# Patient Record
Sex: FEMALE | Race: BLACK OR AFRICAN AMERICAN | NOT HISPANIC OR LATINO
[De-identification: names, ages, dates, MRNs, and addresses within clinical notes are randomized per-mention and may not be internally consistent; named-entity substitution may affect disease eponyms.]

---

## 2018-09-10 ENCOUNTER — RESULT REVIEW (OUTPATIENT)
Age: 43
End: 2018-09-10

## 2019-07-29 PROBLEM — Z00.00 ENCOUNTER FOR PREVENTIVE HEALTH EXAMINATION: Status: ACTIVE | Noted: 2019-07-29

## 2019-07-31 ENCOUNTER — RESULT REVIEW (OUTPATIENT)
Age: 44
End: 2019-07-31

## 2019-08-20 ENCOUNTER — APPOINTMENT (OUTPATIENT)
Dept: OTOLARYNGOLOGY | Facility: CLINIC | Age: 44
End: 2019-08-20
Payer: MEDICAID

## 2019-08-20 VITALS
SYSTOLIC BLOOD PRESSURE: 123 MMHG | BODY MASS INDEX: 33.11 KG/M2 | HEART RATE: 72 BPM | WEIGHT: 206 LBS | DIASTOLIC BLOOD PRESSURE: 82 MMHG | HEIGHT: 66 IN

## 2019-08-20 DIAGNOSIS — R42 DIZZINESS AND GIDDINESS: ICD-10-CM

## 2019-08-20 DIAGNOSIS — R09.81 NASAL CONGESTION: ICD-10-CM

## 2019-08-20 DIAGNOSIS — H93.8X3 OTHER SPECIFIED DISORDERS OF EAR, BILATERAL: ICD-10-CM

## 2019-08-20 DIAGNOSIS — Z78.9 OTHER SPECIFIED HEALTH STATUS: ICD-10-CM

## 2019-08-20 DIAGNOSIS — H91.92 UNSPECIFIED HEARING LOSS, LEFT EAR: ICD-10-CM

## 2019-08-20 DIAGNOSIS — H93.13 TINNITUS, BILATERAL: ICD-10-CM

## 2019-08-20 DIAGNOSIS — Z82.49 FAMILY HISTORY OF ISCHEMIC HEART DISEASE AND OTHER DISEASES OF THE CIRCULATORY SYSTEM: ICD-10-CM

## 2019-08-20 PROCEDURE — 92504 EAR MICROSCOPY EXAMINATION: CPT

## 2019-08-20 PROCEDURE — 99203 OFFICE O/P NEW LOW 30 MIN: CPT | Mod: 25

## 2019-08-20 RX ORDER — AZELASTINE HYDROCHLORIDE 137 UG/1
0.1 SPRAY, METERED NASAL
Qty: 1 | Refills: 2 | Status: ACTIVE | COMMUNITY
Start: 2019-08-20 | End: 1900-01-01

## 2019-08-21 PROBLEM — H93.8X3 CLOGGED EAR, BILATERAL: Status: ACTIVE | Noted: 2019-08-20

## 2019-08-21 PROBLEM — R09.81 NASAL CONGESTION: Status: ACTIVE | Noted: 2019-08-21

## 2019-08-21 PROBLEM — H91.92 HEARING LOSS IN LEFT EAR: Status: ACTIVE | Noted: 2019-08-20

## 2019-08-21 PROBLEM — R42 DIZZINESS: Status: ACTIVE | Noted: 2019-08-20

## 2019-08-21 PROBLEM — H93.13 TINNITUS, BILATERAL: Status: ACTIVE | Noted: 2019-08-20

## 2019-08-21 NOTE — PHYSICAL EXAM
[Binocular Microscopic Exam] : Binocular microscopic exam was performed [Hearing Loss Right Only] : normal [Hearing Loss Left Only] : normal [FreeTextEntry8] : large cerumen impactions, removed [FreeTextEntry9] : large cerumen impactions, removed [Midline] : trachea located in midline position [Normal] : no rashes

## 2019-08-21 NOTE — HISTORY OF PRESENT ILLNESS
[de-identified] : 44 year old female referred by PCP, Dr. Mar, for clogged ears, hearing impairment for the past year, Left worse than right and intermittent tinnitus.  Patient reports ear fullness majority of the time.  States the clogged Left ear causes dizziness occasionally.  Denies otalgia, otorrhea, vertigo, headaches related to ears and ear infections in the past year.

## 2019-08-21 NOTE — PROCEDURE
[Cerumen Impaction] : Cerumen Impaction [Same] : same as the Pre Op Dx. [] : Removal of Cerumen [FreeTextEntry1] : cerumen,ear fullness [FreeTextEntry4] : none [FreeTextEntry6] : Cerumen was removed under binocular microscopy with a combination of a suction, calvin needle, alligator and/or a loop curette. The patient tolerated the procedure well and there were no complications. The included findings were noted.\par

## 2019-08-21 NOTE — REASON FOR VISIT
[Initial Consultation] : an initial consultation for [Other: _____] : [unfilled] [FreeTextEntry2] : Referred by PCP, Dr. Mar, for clogged ears, hearing impairment for the past year, Left worse than right and intermittent tinnitus.

## 2019-11-08 ENCOUNTER — APPOINTMENT (OUTPATIENT)
Dept: OTOLARYNGOLOGY | Facility: CLINIC | Age: 44
End: 2019-11-08

## 2019-11-19 ENCOUNTER — APPOINTMENT (OUTPATIENT)
Dept: OTOLARYNGOLOGY | Facility: CLINIC | Age: 44
End: 2019-11-19

## 2020-03-10 ENCOUNTER — OUTPATIENT (OUTPATIENT)
Dept: OUTPATIENT SERVICES | Facility: HOSPITAL | Age: 45
LOS: 1 days | End: 2020-03-10
Payer: MEDICAID

## 2020-03-10 VITALS
TEMPERATURE: 99 F | DIASTOLIC BLOOD PRESSURE: 92 MMHG | HEART RATE: 67 BPM | SYSTOLIC BLOOD PRESSURE: 147 MMHG | OXYGEN SATURATION: 99 % | WEIGHT: 207.01 LBS | HEIGHT: 66 IN | RESPIRATION RATE: 18 BRPM

## 2020-03-10 DIAGNOSIS — J30.2 OTHER SEASONAL ALLERGIC RHINITIS: ICD-10-CM

## 2020-03-10 DIAGNOSIS — L30.9 DERMATITIS, UNSPECIFIED: ICD-10-CM

## 2020-03-10 DIAGNOSIS — G43.909 MIGRAINE, UNSPECIFIED, NOT INTRACTABLE, WITHOUT STATUS MIGRAINOSUS: ICD-10-CM

## 2020-03-10 DIAGNOSIS — D64.9 ANEMIA, UNSPECIFIED: ICD-10-CM

## 2020-03-10 DIAGNOSIS — I10 ESSENTIAL (PRIMARY) HYPERTENSION: ICD-10-CM

## 2020-03-10 DIAGNOSIS — D25.9 LEIOMYOMA OF UTERUS, UNSPECIFIED: ICD-10-CM

## 2020-03-10 DIAGNOSIS — Z01.818 ENCOUNTER FOR OTHER PREPROCEDURAL EXAMINATION: ICD-10-CM

## 2020-03-10 DIAGNOSIS — Z98.890 OTHER SPECIFIED POSTPROCEDURAL STATES: Chronic | ICD-10-CM

## 2020-03-10 LAB
BLD GP AB SCN SERPL QL: SIGNIFICANT CHANGE UP
HBA1C BLD-MCNC: 5.3 % — SIGNIFICANT CHANGE UP (ref 4–5.6)

## 2020-03-10 PROCEDURE — G0463: CPT

## 2020-03-10 NOTE — H&P PST ADULT - ASSESSMENT
44 yr old female with PMH of Hypertension, seasonal allergies, migraine headache, anemia, eczema presents with leiomyoma of uterus and right ovarian cyst. Pt is scheduled for total abdominal hysterectomy, right ovarian cystectomy on 03/18/2020.

## 2020-03-10 NOTE — H&P PST ADULT - NSICDXPASTMEDICALHX_GEN_ALL_CORE_FT
PAST MEDICAL HISTORY:  Eczema     HTN (hypertension)     Leiomyoma of uterus     Migraine headache     Right ovarian cyst     Seasonal allergies

## 2020-03-10 NOTE — H&P PST ADULT - NSANTHOSAYNRD_GEN_A_CORE
No. ELVIS screening performed.  STOP BANG Legend: 0-2 = LOW Risk; 3-4 = INTERMEDIATE Risk; 5-8 = HIGH Risk

## 2020-03-10 NOTE — H&P PST ADULT - HISTORY OF PRESENT ILLNESS
44 yr old female with PMH of presents with c/o prolonged and heavy menses due to endometrial polyp. Pt for 44 yr old female with PMH of Hypertension, seasonal allergies, migraine headache presents with c/o prolonged and heavy menses associated with anemia, constant crampy pain and clots due to fibroids and right ovarian cyst. Pt for total abdominal hysterectomy, right ovarian cystectomy on 03/18/2020. 44 yr old female with PMH of Hypertension, seasonal allergies, migraine headache presents with c/o prolonged and heavy menses associated with clots and anemia, constant crampy pain, back pain, urinary frequency due to fibroids and right ovarian cyst. Pt for total abdominal hysterectomy, right ovarian cystectomy on 03/18/2020.

## 2020-03-10 NOTE — H&P PST ADULT - NEGATIVE NEUROLOGICAL SYMPTOMS
no focal seizures/no vertigo/no syncope/no difficulty walking/no tremors/no loss of sensation/no generalized seizures

## 2020-03-10 NOTE — H&P PST ADULT - NSICDXFAMILYHX_GEN_ALL_CORE_FT
FAMILY HISTORY:  Family history of asthma in mother  Family history of hypertension in mother  Family history of sarcoidosis, sister  Family history of thrombosis, mother

## 2020-03-10 NOTE — H&P PST ADULT - NEGATIVE GASTROINTESTINAL SYMPTOMS
no flatulence/no abdominal pain/no melena/no vomiting/no diarrhea/no change in bowel habits/no nausea

## 2020-03-10 NOTE — H&P PST ADULT - NSICDXPROBLEM_GEN_ALL_CORE_FT
PROBLEM DIAGNOSES  Problem: Eczema  Assessment and Plan: Instructed to continue use of ointment and follow-up with provider postop for management.      Problem: Leiomyoma of uterus  Assessment and Plan: Total abdominal hysterectomy, right ovarian cystectomy on 03/18/20.Preoperative instructions discussed with pt and given to pt. Verbalized understanding of instructions given.     Problem: Anemia  Assessment and Plan: Instructed to continue Ferrous sulfate and to follow-up with provider postop for management.     Problem: HTN (hypertension)  Assessment and Plan: Instructed to continue Amlodipine and take with sips of water on day of surgery. Lifestyle modifications encouraged. Encouraged compliance with medication.  Cleared by PCP. Follow-up with PCP postop for management.     Problem: Seasonal allergies  Assessment and Plan: Instructed to continue claritin as needed and to follow-up with PCP for allergy management.     Problem: Migraine headache  Assessment and Plan: Pt instructed to avoid NSAIDs 1 week before surgery.  Advised to take Tylenol as needed for pain. Stated understanding of instructions given. PROBLEM DIAGNOSES  Problem: Eczema  Assessment and Plan: Instructed to continue use of ointment and follow-up with provider postop for management.      Problem: Leiomyoma of uterus  Assessment and Plan: Total abdominal hysterectomy, right ovarian cystectomy on 03/18/20.Preoperative instructions discussed with pt and given to pt. Verbalized understanding of instructions given.     Problem: Anemia  Assessment and Plan: Instructed to continue Ferrous sulfate and to follow-up with provider postop for management.     Problem: HTN (hypertension)  Assessment and Plan: Instructed to continue Amlodipine and take with sips of water on day of surgery. Lifestyle modifications encouraged. Encouraged compliance with medication.  Cleared by PCP. Follow-up with PCP postop for management.     Problem: Seasonal allergies  Assessment and Plan: Instructed to continue Claritin as needed and to follow-up with PCP for allergy management.     Problem: Migraine headache  Assessment and Plan: Pt instructed to avoid NSAIDs 1 week before surgery.  Advised to take Tylenol as needed for pain. Stated understanding of instructions given. PROBLEM DIAGNOSES  Problem: At risk for venous thromboembolism (VTE)  Assessment and Plan: VTE score risk 6. Perioperative VTE prophylaxis.    Problem: Eczema  Assessment and Plan: Instructed to continue use of ointment and follow-up with provider postop for management.      Problem: Leiomyoma of uterus  Assessment and Plan: Total abdominal hysterectomy, right ovarian cystectomy on 03/18/20.Preoperative instructions discussed with pt and given to pt. Verbalized understanding of instructions given.     Problem: Anemia  Assessment and Plan: Instructed to continue Ferrous sulfate and to follow-up with provider postop for management.     Problem: HTN (hypertension)  Assessment and Plan: Instructed to continue Amlodipine and take with sips of water on day of surgery. Lifestyle modifications encouraged. Encouraged compliance with medication.  Cleared by PCP. Follow-up with PCP postop for management.     Problem: Seasonal allergies  Assessment and Plan: Instructed to continue claritin as needed and to follow-up with PCP for allergy management.     Problem: Migraine headache  Assessment and Plan: Pt instructed to avoid NSAIDs 1 week before surgery.  Advised to take Tylenol as needed for pain. Stated understanding of instructions given.

## 2020-03-10 NOTE — H&P PST ADULT - ACTIVITY
tolerates stair climbing without any exertional symptoms ; limited due to pain; stopped going to the gym x 1 month

## 2020-03-10 NOTE — H&P PST ADULT - RS GEN PE MLT RESP DETAILS PC
good air movement/no intercostal retractions/normal/airway patent/no wheezes/no chest wall tenderness/breath sounds equal/clear to auscultation bilaterally/no rales/respirations non-labored/no rhonchi/no subcutaneous emphysema

## 2020-08-25 PROBLEM — I10 ESSENTIAL (PRIMARY) HYPERTENSION: Chronic | Status: ACTIVE | Noted: 2020-03-10

## 2020-08-25 PROBLEM — L30.9 DERMATITIS, UNSPECIFIED: Chronic | Status: ACTIVE | Noted: 2020-03-10

## 2020-08-25 PROBLEM — J30.2 OTHER SEASONAL ALLERGIC RHINITIS: Chronic | Status: ACTIVE | Noted: 2020-03-10

## 2020-08-25 PROBLEM — G43.909 MIGRAINE, UNSPECIFIED, NOT INTRACTABLE, WITHOUT STATUS MIGRAINOSUS: Chronic | Status: ACTIVE | Noted: 2020-03-10

## 2020-08-25 PROBLEM — D25.9 LEIOMYOMA OF UTERUS, UNSPECIFIED: Chronic | Status: ACTIVE | Noted: 2020-03-10

## 2020-09-04 ENCOUNTER — OUTPATIENT (OUTPATIENT)
Dept: OUTPATIENT SERVICES | Facility: HOSPITAL | Age: 45
LOS: 1 days | End: 2020-09-04
Payer: MEDICAID

## 2020-09-04 VITALS
HEIGHT: 66 IN | DIASTOLIC BLOOD PRESSURE: 98 MMHG | SYSTOLIC BLOOD PRESSURE: 140 MMHG | TEMPERATURE: 100 F | HEART RATE: 78 BPM | WEIGHT: 210.1 LBS | OXYGEN SATURATION: 100 % | RESPIRATION RATE: 17 BRPM

## 2020-09-04 DIAGNOSIS — Z98.890 OTHER SPECIFIED POSTPROCEDURAL STATES: Chronic | ICD-10-CM

## 2020-09-04 DIAGNOSIS — N83.202 UNSPECIFIED OVARIAN CYST, LEFT SIDE: ICD-10-CM

## 2020-09-04 DIAGNOSIS — I10 ESSENTIAL (PRIMARY) HYPERTENSION: ICD-10-CM

## 2020-09-04 DIAGNOSIS — D25.9 LEIOMYOMA OF UTERUS, UNSPECIFIED: ICD-10-CM

## 2020-09-04 LAB — BLD GP AB SCN SERPL QL: SIGNIFICANT CHANGE UP

## 2020-09-04 PROCEDURE — G0463: CPT

## 2020-09-04 RX ORDER — ACETAMINOPHEN 500 MG
2 TABLET ORAL
Qty: 0 | Refills: 0 | DISCHARGE

## 2020-09-04 RX ORDER — RANITIDINE HYDROCHLORIDE 75 MG/1
2 TABLET, COATED ORAL
Qty: 0 | Refills: 0 | DISCHARGE

## 2020-09-04 NOTE — H&P PST ADULT - ASSESSMENT
45 year old Black female with history of HTN, obesity Eczema experiencing irregular menstrual cycles for the past months cycles lasting 7-10 days. Pt schedule for total hysterectomy left ovarian cystectomy on 9/16/2020.

## 2020-09-04 NOTE — H&P PST ADULT - HISTORY OF PRESENT ILLNESS
45 year old obese Black female with history of Migraines ,Eczema, obesity presents with fibroid uterus and left ovarian cyst. Pt was scheduled for surgery in March was postponed due to Covid virus. Pt now rescheduled for 9/16/2020 for Total Hysterectomy and left ovarian cystectomy. Pt stated her menstrual cycle was normal 28 days lasting only 3 days. Her cycle changed over time with cycle lasting 7-10 days and sometimes cycle comes twice in a month. Pt never transfused blood only on iron supplements.

## 2020-09-04 NOTE — H&P PST ADULT - NSICDXPROBLEM_GEN_ALL_CORE_FT
PROBLEM DIAGNOSES  Problem: Unspecified ovarian cyst, left side  Assessment and Plan: schedule for left ovarian cystectomy    Problem: Leiomyoma of uterus, unspecified  Assessment and Plan: schedule for total abdominal hysterectomy    Problem: HTN (hypertension)  Assessment and Plan: Pt instructed to take bp medication am of surgery

## 2020-09-04 NOTE — H&P PST ADULT - NSICDXPASTMEDICALHX_GEN_ALL_CORE_FT
PAST MEDICAL HISTORY:  Eczema     HTN (hypertension)     Leiomyoma of uterus     Migraine headache     Mild obesity     Seasonal allergies     Unspecified ovarian cyst, left side

## 2020-09-04 NOTE — H&P PST ADULT - ATTENDING COMMENTS
44 yo with fibroid uterus and left ovarian cyst. For hysterectomy via laparotomy, left ovarian cystectomy and possible left oophorectomy. Pt is requesting not to remove the cervix. Discussed risks including infection, bleeding, transfusion, injury to intestines, bladder and ureters. Pt understands risks and agrees to proceed with surgery. Discussed subtotal hysterectomy in detail. 44 yo with fibroid uterus and left ovarian cyst. For hysterectomy via laparotomy, left ovarian cystectomy and possible left oophorectomy. Pt is requesting not to remove the cervix. Discussed risks including infection, bleeding, transfusion, injury to intestines, bladder and ureters. Pt understands risks and agrees to proceed with surgery. Discussed subtotal hysterectomy in detail.    addendum 9/16/20-Pt changed her mind. She is asking to remove the cervix.

## 2020-09-05 LAB
A1C WITH ESTIMATED AVERAGE GLUCOSE RESULT: 5.5 % — SIGNIFICANT CHANGE UP (ref 4–5.6)
ESTIMATED AVERAGE GLUCOSE: 111 MG/DL — SIGNIFICANT CHANGE UP (ref 68–114)

## 2020-09-15 ENCOUNTER — TRANSCRIPTION ENCOUNTER (OUTPATIENT)
Age: 45
End: 2020-09-15

## 2020-09-16 ENCOUNTER — RESULT REVIEW (OUTPATIENT)
Age: 45
End: 2020-09-16

## 2020-09-16 ENCOUNTER — INPATIENT (INPATIENT)
Facility: HOSPITAL | Age: 45
LOS: 3 days | Discharge: HOME | DRG: 742 | End: 2020-09-19
Attending: OBSTETRICS & GYNECOLOGY | Admitting: OBSTETRICS & GYNECOLOGY
Payer: MEDICAID

## 2020-09-16 VITALS
OXYGEN SATURATION: 100 % | DIASTOLIC BLOOD PRESSURE: 76 MMHG | HEART RATE: 83 BPM | TEMPERATURE: 98 F | RESPIRATION RATE: 16 BRPM | WEIGHT: 210.1 LBS | SYSTOLIC BLOOD PRESSURE: 122 MMHG | HEIGHT: 66 IN

## 2020-09-16 DIAGNOSIS — Z98.890 OTHER SPECIFIED POSTPROCEDURAL STATES: Chronic | ICD-10-CM

## 2020-09-16 DIAGNOSIS — D25.9 LEIOMYOMA OF UTERUS, UNSPECIFIED: ICD-10-CM

## 2020-09-16 LAB
BASOPHILS # BLD AUTO: 0.03 K/UL — SIGNIFICANT CHANGE UP (ref 0–0.2)
BASOPHILS NFR BLD AUTO: 0.1 % — SIGNIFICANT CHANGE UP (ref 0–2)
BLD GP AB SCN SERPL QL: SIGNIFICANT CHANGE UP
EOSINOPHIL # BLD AUTO: 0.02 K/UL — SIGNIFICANT CHANGE UP (ref 0–0.5)
EOSINOPHIL NFR BLD AUTO: 0.1 % — SIGNIFICANT CHANGE UP (ref 0–6)
GLUCOSE BLDC GLUCOMTR-MCNC: 108 MG/DL — HIGH (ref 70–99)
GLUCOSE BLDC GLUCOMTR-MCNC: 97 MG/DL — SIGNIFICANT CHANGE UP (ref 70–99)
HCG UR QL: NEGATIVE — SIGNIFICANT CHANGE UP
HCT VFR BLD CALC: 31.6 % — LOW (ref 34.5–45)
HCT VFR BLD CALC: 32.1 % — LOW (ref 34.5–45)
HGB BLD-MCNC: 10 G/DL — LOW (ref 11.5–15.5)
HGB BLD-MCNC: 10 G/DL — LOW (ref 11.5–15.5)
IMM GRANULOCYTES NFR BLD AUTO: 0.5 % — SIGNIFICANT CHANGE UP (ref 0–1.5)
LYMPHOCYTES # BLD AUTO: 0.62 K/UL — LOW (ref 1–3.3)
LYMPHOCYTES # BLD AUTO: 2.6 % — LOW (ref 13–44)
MCHC RBC-ENTMCNC: 27.5 PG — SIGNIFICANT CHANGE UP (ref 27–34)
MCHC RBC-ENTMCNC: 27.9 PG — SIGNIFICANT CHANGE UP (ref 27–34)
MCHC RBC-ENTMCNC: 31.2 GM/DL — LOW (ref 32–36)
MCHC RBC-ENTMCNC: 31.6 GM/DL — LOW (ref 32–36)
MCV RBC AUTO: 88 FL — SIGNIFICANT CHANGE UP (ref 80–100)
MCV RBC AUTO: 88.2 FL — SIGNIFICANT CHANGE UP (ref 80–100)
MONOCYTES # BLD AUTO: 0.38 K/UL — SIGNIFICANT CHANGE UP (ref 0–0.9)
MONOCYTES NFR BLD AUTO: 1.6 % — LOW (ref 2–14)
NEUTROPHILS # BLD AUTO: 22.96 K/UL — HIGH (ref 1.8–7.4)
NEUTROPHILS NFR BLD AUTO: 95.1 % — HIGH (ref 43–77)
NRBC # BLD: 0 /100 WBCS — SIGNIFICANT CHANGE UP (ref 0–0)
NRBC # BLD: 0 /100 WBCS — SIGNIFICANT CHANGE UP (ref 0–0)
PLATELET # BLD AUTO: 259 K/UL — SIGNIFICANT CHANGE UP (ref 150–400)
PLATELET # BLD AUTO: 263 K/UL — SIGNIFICANT CHANGE UP (ref 150–400)
RBC # BLD: 3.59 M/UL — LOW (ref 3.8–5.2)
RBC # BLD: 3.64 M/UL — LOW (ref 3.8–5.2)
RBC # FLD: 15.1 % — HIGH (ref 10.3–14.5)
RBC # FLD: 15.3 % — HIGH (ref 10.3–14.5)
WBC # BLD: 21.66 K/UL — HIGH (ref 3.8–10.5)
WBC # BLD: 24.13 K/UL — HIGH (ref 3.8–10.5)
WBC # FLD AUTO: 21.66 K/UL — HIGH (ref 3.8–10.5)
WBC # FLD AUTO: 24.13 K/UL — HIGH (ref 3.8–10.5)

## 2020-09-16 PROCEDURE — 88307 TISSUE EXAM BY PATHOLOGIST: CPT | Mod: 26

## 2020-09-16 RX ORDER — ZOLPIDEM TARTRATE 10 MG/1
5 TABLET ORAL AT BEDTIME
Refills: 0 | Status: DISCONTINUED | OUTPATIENT
Start: 2020-09-16 | End: 2020-09-19

## 2020-09-16 RX ORDER — SODIUM CHLORIDE 9 MG/ML
1000 INJECTION, SOLUTION INTRAVENOUS
Refills: 0 | Status: DISCONTINUED | OUTPATIENT
Start: 2020-09-16 | End: 2020-09-16

## 2020-09-16 RX ORDER — HYDROMORPHONE HYDROCHLORIDE 2 MG/ML
1 INJECTION INTRAMUSCULAR; INTRAVENOUS; SUBCUTANEOUS
Refills: 0 | Status: DISCONTINUED | OUTPATIENT
Start: 2020-09-16 | End: 2020-09-16

## 2020-09-16 RX ORDER — ACETAMINOPHEN 500 MG
1000 TABLET ORAL ONCE
Refills: 0 | Status: DISCONTINUED | OUTPATIENT
Start: 2020-09-16 | End: 2020-09-16

## 2020-09-16 RX ORDER — SODIUM CHLORIDE 9 MG/ML
3 INJECTION INTRAMUSCULAR; INTRAVENOUS; SUBCUTANEOUS EVERY 8 HOURS
Refills: 0 | Status: DISCONTINUED | OUTPATIENT
Start: 2020-09-16 | End: 2020-09-16

## 2020-09-16 RX ORDER — SODIUM CHLORIDE 9 MG/ML
1000 INJECTION, SOLUTION INTRAVENOUS
Refills: 0 | Status: DISCONTINUED | OUTPATIENT
Start: 2020-09-16 | End: 2020-09-19

## 2020-09-16 RX ORDER — HYDROMORPHONE HYDROCHLORIDE 2 MG/ML
0.5 INJECTION INTRAMUSCULAR; INTRAVENOUS; SUBCUTANEOUS
Refills: 0 | Status: DISCONTINUED | OUTPATIENT
Start: 2020-09-16 | End: 2020-09-16

## 2020-09-16 RX ORDER — LORATADINE 10 MG/1
10 TABLET ORAL DAILY
Refills: 0 | Status: DISCONTINUED | OUTPATIENT
Start: 2020-09-17 | End: 2020-09-19

## 2020-09-16 RX ORDER — HEPARIN SODIUM 5000 [USP'U]/ML
5000 INJECTION INTRAVENOUS; SUBCUTANEOUS EVERY 12 HOURS
Refills: 0 | Status: DISCONTINUED | OUTPATIENT
Start: 2020-09-16 | End: 2020-09-19

## 2020-09-16 RX ORDER — KETOROLAC TROMETHAMINE 30 MG/ML
30 SYRINGE (ML) INJECTION EVERY 6 HOURS
Refills: 0 | Status: DISCONTINUED | OUTPATIENT
Start: 2020-09-16 | End: 2020-09-19

## 2020-09-16 RX ORDER — FERROUS SULFATE 325(65) MG
325 TABLET ORAL THREE TIMES A DAY
Refills: 0 | Status: DISCONTINUED | OUTPATIENT
Start: 2020-09-16 | End: 2020-09-19

## 2020-09-16 RX ORDER — ONDANSETRON 8 MG/1
4 TABLET, FILM COATED ORAL ONCE
Refills: 0 | Status: DISCONTINUED | OUTPATIENT
Start: 2020-09-16 | End: 2020-09-16

## 2020-09-16 RX ORDER — ONDANSETRON 8 MG/1
4 TABLET, FILM COATED ORAL EVERY 4 HOURS
Refills: 0 | Status: DISCONTINUED | OUTPATIENT
Start: 2020-09-16 | End: 2020-09-19

## 2020-09-16 RX ORDER — MORPHINE SULFATE 50 MG/1
4 CAPSULE, EXTENDED RELEASE ORAL EVERY 4 HOURS
Refills: 0 | Status: DISCONTINUED | OUTPATIENT
Start: 2020-09-16 | End: 2020-09-17

## 2020-09-16 RX ORDER — CHLORHEXIDINE GLUCONATE 213 G/1000ML
1 SOLUTION TOPICAL ONCE
Refills: 0 | Status: DISCONTINUED | OUTPATIENT
Start: 2020-09-16 | End: 2020-09-16

## 2020-09-16 RX ADMIN — Medication 325 MILLIGRAM(S): at 21:06

## 2020-09-16 RX ADMIN — Medication 30 MILLIGRAM(S): at 22:49

## 2020-09-16 RX ADMIN — HYDROMORPHONE HYDROCHLORIDE 0.5 MILLIGRAM(S): 2 INJECTION INTRAMUSCULAR; INTRAVENOUS; SUBCUTANEOUS at 13:10

## 2020-09-16 RX ADMIN — SODIUM CHLORIDE 125 MILLILITER(S): 9 INJECTION, SOLUTION INTRAVENOUS at 13:09

## 2020-09-16 RX ADMIN — Medication 30 MILLIGRAM(S): at 21:07

## 2020-09-16 RX ADMIN — HYDROMORPHONE HYDROCHLORIDE 0.5 MILLIGRAM(S): 2 INJECTION INTRAMUSCULAR; INTRAVENOUS; SUBCUTANEOUS at 12:18

## 2020-09-16 NOTE — ASU PREOP CHECKLIST - NS PREOP CHK CHLOROHEX WASH
Patient had INR drawn today in lab. Writer spoke to patient by phone. Reviewed past INR and dose with patient. Patient held Warfarin as directed for supra therapeutic INR. Patient denies missed doses of Warfarin or medication changes. Patient continues to walk twice per day. Vitamin K intake is consistent. Reviewed INR goal. INR therapeutic. Warfarin dose adjusted and will recheck INR in 10 days. Reminded patient to call office with any changes. Patient verbalizes understanding. INR and dose per Dr. STEVE Priest's protocol.     at home:

## 2020-09-16 NOTE — BRIEF OPERATIVE NOTE - NSICDXBRIEFPREOP_GEN_ALL_CORE_FT
PRE-OP DIAGNOSIS:  Left ovarian cyst 16-Sep-2020 11:27:15  Keaton Peck  Leiomyoma uteri 16-Sep-2020 11:26:39  Keaton Peck

## 2020-09-16 NOTE — CHART NOTE - NSCHARTNOTEFT_GEN_A_CORE
Pt seen at bedside offers no complaints. Denies n/v, cp; sob; palpitations; or dizziness    T(C): 37.1 (09-16-20 @ 16:05), Max: 37.1 (09-16-20 @ 16:05)  HR: 79 (09-16-20 @ 16:05) (74 - 87)  BP: 137/73 (09-16-20 @ 16:05) (118/71 - 139/81)  RR: 18 (09-16-20 @ 16:05) (14 - 20)  SpO2: 99% (09-16-20 @ 16:05) (96% - 100%)    abd: soft/nt, fundus firm, dressing in place C/D/I  pelvic: minimal lochia  ext: venodynes in place; no calf pain    a/p POD #0 s/p JANET/BS   cont close monitor   cont post op care  cbc @4pm pending will f/u   d/w dr Peck

## 2020-09-16 NOTE — BRIEF OPERATIVE NOTE - OPERATION/FINDINGS
fibroid uterus  extensive adhesion  left ovarian endometrioma fibroid uterus  extensive adhesion in CDS involving both ovaries, posterior uterus and uterosacral ligaments   left ovarian endometrioma

## 2020-09-16 NOTE — BRIEF OPERATIVE NOTE - NSICDXBRIEFPOSTOP_GEN_ALL_CORE_FT
POST-OP DIAGNOSIS:  Left ovarian cyst 16-Sep-2020 11:27:58  Keaton Peck  Leiomyoma uteri 16-Sep-2020 11:25:25  Keaton Peck

## 2020-09-17 LAB
ANION GAP SERPL CALC-SCNC: 6 MMOL/L — SIGNIFICANT CHANGE UP (ref 5–17)
BASOPHILS # BLD AUTO: 0.01 K/UL — SIGNIFICANT CHANGE UP (ref 0–0.2)
BASOPHILS NFR BLD AUTO: 0.1 % — SIGNIFICANT CHANGE UP (ref 0–2)
BUN SERPL-MCNC: 6 MG/DL — LOW (ref 7–18)
CALCIUM SERPL-MCNC: 8.5 MG/DL — SIGNIFICANT CHANGE UP (ref 8.4–10.5)
CHLORIDE SERPL-SCNC: 109 MMOL/L — HIGH (ref 96–108)
CO2 SERPL-SCNC: 25 MMOL/L — SIGNIFICANT CHANGE UP (ref 22–31)
CREAT SERPL-MCNC: 0.78 MG/DL — SIGNIFICANT CHANGE UP (ref 0.5–1.3)
EOSINOPHIL # BLD AUTO: 0.01 K/UL — SIGNIFICANT CHANGE UP (ref 0–0.5)
EOSINOPHIL NFR BLD AUTO: 0.1 % — SIGNIFICANT CHANGE UP (ref 0–6)
GLUCOSE SERPL-MCNC: 94 MG/DL — SIGNIFICANT CHANGE UP (ref 70–99)
HCT VFR BLD CALC: 29.3 % — LOW (ref 34.5–45)
HGB BLD-MCNC: 9 G/DL — LOW (ref 11.5–15.5)
IMM GRANULOCYTES NFR BLD AUTO: 0.4 % — SIGNIFICANT CHANGE UP (ref 0–1.5)
LYMPHOCYTES # BLD AUTO: 1.85 K/UL — SIGNIFICANT CHANGE UP (ref 1–3.3)
LYMPHOCYTES # BLD AUTO: 13.2 % — SIGNIFICANT CHANGE UP (ref 13–44)
MCHC RBC-ENTMCNC: 26.8 PG — LOW (ref 27–34)
MCHC RBC-ENTMCNC: 30.7 GM/DL — LOW (ref 32–36)
MCV RBC AUTO: 87.2 FL — SIGNIFICANT CHANGE UP (ref 80–100)
MONOCYTES # BLD AUTO: 1.28 K/UL — HIGH (ref 0–0.9)
MONOCYTES NFR BLD AUTO: 9.1 % — SIGNIFICANT CHANGE UP (ref 2–14)
NEUTROPHILS # BLD AUTO: 10.8 K/UL — HIGH (ref 1.8–7.4)
NEUTROPHILS NFR BLD AUTO: 77.1 % — HIGH (ref 43–77)
NRBC # BLD: 0 /100 WBCS — SIGNIFICANT CHANGE UP (ref 0–0)
PLATELET # BLD AUTO: 276 K/UL — SIGNIFICANT CHANGE UP (ref 150–400)
POTASSIUM SERPL-MCNC: 3.8 MMOL/L — SIGNIFICANT CHANGE UP (ref 3.5–5.3)
POTASSIUM SERPL-SCNC: 3.8 MMOL/L — SIGNIFICANT CHANGE UP (ref 3.5–5.3)
RBC # BLD: 3.36 M/UL — LOW (ref 3.8–5.2)
RBC # FLD: 15.5 % — HIGH (ref 10.3–14.5)
SODIUM SERPL-SCNC: 140 MMOL/L — SIGNIFICANT CHANGE UP (ref 135–145)
WBC # BLD: 14.01 K/UL — HIGH (ref 3.8–10.5)
WBC # FLD AUTO: 14.01 K/UL — HIGH (ref 3.8–10.5)

## 2020-09-17 RX ORDER — OXYCODONE HYDROCHLORIDE 5 MG/1
5 TABLET ORAL EVERY 4 HOURS
Refills: 0 | Status: DISCONTINUED | OUTPATIENT
Start: 2020-09-17 | End: 2020-09-18

## 2020-09-17 RX ORDER — PANTOPRAZOLE SODIUM 20 MG/1
40 TABLET, DELAYED RELEASE ORAL
Refills: 0 | Status: DISCONTINUED | OUTPATIENT
Start: 2020-09-17 | End: 2020-09-19

## 2020-09-17 RX ORDER — SENNA PLUS 8.6 MG/1
2 TABLET ORAL AT BEDTIME
Refills: 0 | Status: DISCONTINUED | OUTPATIENT
Start: 2020-09-17 | End: 2020-09-19

## 2020-09-17 RX ORDER — SIMETHICONE 80 MG/1
80 TABLET, CHEWABLE ORAL EVERY 6 HOURS
Refills: 0 | Status: DISCONTINUED | OUTPATIENT
Start: 2020-09-17 | End: 2020-09-19

## 2020-09-17 RX ADMIN — Medication 1 CAPSULE(S): at 01:45

## 2020-09-17 RX ADMIN — OXYCODONE HYDROCHLORIDE 5 MILLIGRAM(S): 5 TABLET ORAL at 08:19

## 2020-09-17 RX ADMIN — Medication 325 MILLIGRAM(S): at 21:42

## 2020-09-17 RX ADMIN — LORATADINE 10 MILLIGRAM(S): 10 TABLET ORAL at 12:04

## 2020-09-17 RX ADMIN — Medication 1 CAPSULE(S): at 00:30

## 2020-09-17 RX ADMIN — OXYCODONE HYDROCHLORIDE 5 MILLIGRAM(S): 5 TABLET ORAL at 21:52

## 2020-09-17 RX ADMIN — Medication 30 MILLIGRAM(S): at 12:32

## 2020-09-17 RX ADMIN — Medication 325 MILLIGRAM(S): at 05:30

## 2020-09-17 RX ADMIN — HEPARIN SODIUM 5000 UNIT(S): 5000 INJECTION INTRAVENOUS; SUBCUTANEOUS at 12:05

## 2020-09-17 RX ADMIN — SENNA PLUS 2 TABLET(S): 8.6 TABLET ORAL at 21:42

## 2020-09-17 RX ADMIN — Medication 30 MILLIGRAM(S): at 12:17

## 2020-09-17 RX ADMIN — PANTOPRAZOLE SODIUM 40 MILLIGRAM(S): 20 TABLET, DELAYED RELEASE ORAL at 12:04

## 2020-09-17 RX ADMIN — OXYCODONE HYDROCHLORIDE 5 MILLIGRAM(S): 5 TABLET ORAL at 09:15

## 2020-09-17 RX ADMIN — SIMETHICONE 80 MILLIGRAM(S): 80 TABLET, CHEWABLE ORAL at 12:04

## 2020-09-17 RX ADMIN — Medication 325 MILLIGRAM(S): at 12:06

## 2020-09-17 RX ADMIN — OXYCODONE HYDROCHLORIDE 5 MILLIGRAM(S): 5 TABLET ORAL at 22:45

## 2020-09-17 RX ADMIN — SIMETHICONE 80 MILLIGRAM(S): 80 TABLET, CHEWABLE ORAL at 17:58

## 2020-09-17 RX ADMIN — HEPARIN SODIUM 5000 UNIT(S): 5000 INJECTION INTRAVENOUS; SUBCUTANEOUS at 00:08

## 2020-09-17 RX ADMIN — SIMETHICONE 80 MILLIGRAM(S): 80 TABLET, CHEWABLE ORAL at 08:19

## 2020-09-17 NOTE — PROGRESS NOTE ADULT - PROBLEM SELECTOR PLAN 1
A/P: 46y/o POD# 1 s/p JANET BS with acute on chronic anemia, stable  -cont pain management, postop care  -advance diet to regular after flatus  -oob, encourage ambulation  -iron PO for anemia  -heparin dvt  -f/u void  -d/w Dr. Peck

## 2020-09-18 LAB
ALBUMIN SERPL ELPH-MCNC: 3 G/DL — LOW (ref 3.5–5)
ALP SERPL-CCNC: 55 U/L — SIGNIFICANT CHANGE UP (ref 40–120)
ALT FLD-CCNC: 17 U/L DA — SIGNIFICANT CHANGE UP (ref 10–60)
ANION GAP SERPL CALC-SCNC: 3 MMOL/L — LOW (ref 5–17)
AST SERPL-CCNC: 16 U/L — SIGNIFICANT CHANGE UP (ref 10–40)
BASOPHILS # BLD AUTO: 0.04 K/UL — SIGNIFICANT CHANGE UP (ref 0–0.2)
BASOPHILS NFR BLD AUTO: 0.3 % — SIGNIFICANT CHANGE UP (ref 0–2)
BILIRUB SERPL-MCNC: 0.5 MG/DL — SIGNIFICANT CHANGE UP (ref 0.2–1.2)
BUN SERPL-MCNC: 7 MG/DL — SIGNIFICANT CHANGE UP (ref 7–18)
CALCIUM SERPL-MCNC: 8.4 MG/DL — SIGNIFICANT CHANGE UP (ref 8.4–10.5)
CHLORIDE SERPL-SCNC: 107 MMOL/L — SIGNIFICANT CHANGE UP (ref 96–108)
CO2 SERPL-SCNC: 29 MMOL/L — SIGNIFICANT CHANGE UP (ref 22–31)
CREAT SERPL-MCNC: 0.76 MG/DL — SIGNIFICANT CHANGE UP (ref 0.5–1.3)
EOSINOPHIL # BLD AUTO: 0.01 K/UL — SIGNIFICANT CHANGE UP (ref 0–0.5)
EOSINOPHIL NFR BLD AUTO: 0.1 % — SIGNIFICANT CHANGE UP (ref 0–6)
GLUCOSE SERPL-MCNC: 84 MG/DL — SIGNIFICANT CHANGE UP (ref 70–99)
HCT VFR BLD CALC: 28.2 % — LOW (ref 34.5–45)
HGB BLD-MCNC: 9 G/DL — LOW (ref 11.5–15.5)
IMM GRANULOCYTES NFR BLD AUTO: 0.5 % — SIGNIFICANT CHANGE UP (ref 0–1.5)
LYMPHOCYTES # BLD AUTO: 1.31 K/UL — SIGNIFICANT CHANGE UP (ref 1–3.3)
LYMPHOCYTES # BLD AUTO: 8.9 % — LOW (ref 13–44)
MCHC RBC-ENTMCNC: 27.4 PG — SIGNIFICANT CHANGE UP (ref 27–34)
MCHC RBC-ENTMCNC: 31.9 GM/DL — LOW (ref 32–36)
MCV RBC AUTO: 86 FL — SIGNIFICANT CHANGE UP (ref 80–100)
MONOCYTES # BLD AUTO: 0.86 K/UL — SIGNIFICANT CHANGE UP (ref 0–0.9)
MONOCYTES NFR BLD AUTO: 5.9 % — SIGNIFICANT CHANGE UP (ref 2–14)
NEUTROPHILS # BLD AUTO: 12.39 K/UL — HIGH (ref 1.8–7.4)
NEUTROPHILS NFR BLD AUTO: 84.3 % — HIGH (ref 43–77)
NRBC # BLD: 0 /100 WBCS — SIGNIFICANT CHANGE UP (ref 0–0)
PLATELET # BLD AUTO: 248 K/UL — SIGNIFICANT CHANGE UP (ref 150–400)
POTASSIUM SERPL-MCNC: 3.6 MMOL/L — SIGNIFICANT CHANGE UP (ref 3.5–5.3)
POTASSIUM SERPL-SCNC: 3.6 MMOL/L — SIGNIFICANT CHANGE UP (ref 3.5–5.3)
PROT SERPL-MCNC: 6.8 G/DL — SIGNIFICANT CHANGE UP (ref 6–8.3)
RBC # BLD: 3.28 M/UL — LOW (ref 3.8–5.2)
RBC # FLD: 15.7 % — HIGH (ref 10.3–14.5)
SODIUM SERPL-SCNC: 139 MMOL/L — SIGNIFICANT CHANGE UP (ref 135–145)
WBC # BLD: 14.69 K/UL — HIGH (ref 3.8–10.5)
WBC # FLD AUTO: 14.69 K/UL — HIGH (ref 3.8–10.5)

## 2020-09-18 RX ORDER — METOCLOPRAMIDE HCL 10 MG
10 TABLET ORAL EVERY 6 HOURS
Refills: 0 | Status: DISCONTINUED | OUTPATIENT
Start: 2020-09-18 | End: 2020-09-19

## 2020-09-18 RX ADMIN — HEPARIN SODIUM 5000 UNIT(S): 5000 INJECTION INTRAVENOUS; SUBCUTANEOUS at 23:48

## 2020-09-18 RX ADMIN — Medication 10 MILLIGRAM(S): at 17:28

## 2020-09-18 RX ADMIN — LORATADINE 10 MILLIGRAM(S): 10 TABLET ORAL at 12:35

## 2020-09-18 RX ADMIN — HEPARIN SODIUM 5000 UNIT(S): 5000 INJECTION INTRAVENOUS; SUBCUTANEOUS at 12:35

## 2020-09-18 RX ADMIN — SIMETHICONE 80 MILLIGRAM(S): 80 TABLET, CHEWABLE ORAL at 23:49

## 2020-09-18 RX ADMIN — SIMETHICONE 80 MILLIGRAM(S): 80 TABLET, CHEWABLE ORAL at 12:35

## 2020-09-18 RX ADMIN — Medication 325 MILLIGRAM(S): at 14:18

## 2020-09-18 RX ADMIN — SODIUM CHLORIDE 125 MILLILITER(S): 9 INJECTION, SOLUTION INTRAVENOUS at 18:00

## 2020-09-18 RX ADMIN — SIMETHICONE 80 MILLIGRAM(S): 80 TABLET, CHEWABLE ORAL at 05:26

## 2020-09-18 RX ADMIN — SENNA PLUS 2 TABLET(S): 8.6 TABLET ORAL at 22:14

## 2020-09-18 RX ADMIN — SIMETHICONE 80 MILLIGRAM(S): 80 TABLET, CHEWABLE ORAL at 17:38

## 2020-09-18 RX ADMIN — ONDANSETRON 4 MILLIGRAM(S): 8 TABLET, FILM COATED ORAL at 14:41

## 2020-09-18 RX ADMIN — Medication 325 MILLIGRAM(S): at 22:14

## 2020-09-18 NOTE — CHART NOTE - NSCHARTNOTEFT_GEN_A_CORE
Patient noted to have vomiting after clear diet with lunch.  Zofran given.      Abdomen soft and nondistended, +BS    Discussed with Dr Peck - plan to continue antiemetic and anti nausea meds at current time.  If vomiting continues with no passage of flatus, plan to obtain imaging tomorrow AM.

## 2020-09-19 ENCOUNTER — TRANSCRIPTION ENCOUNTER (OUTPATIENT)
Age: 45
End: 2020-09-19

## 2020-09-19 VITALS
DIASTOLIC BLOOD PRESSURE: 80 MMHG | RESPIRATION RATE: 18 BRPM | TEMPERATURE: 98 F | OXYGEN SATURATION: 96 % | HEART RATE: 80 BPM | SYSTOLIC BLOOD PRESSURE: 116 MMHG

## 2020-09-19 LAB
HCT VFR BLD CALC: 26.1 % — LOW (ref 34.5–45)
HGB BLD-MCNC: 8.1 G/DL — LOW (ref 11.5–15.5)
MCHC RBC-ENTMCNC: 27.4 PG — SIGNIFICANT CHANGE UP (ref 27–34)
MCHC RBC-ENTMCNC: 31 GM/DL — LOW (ref 32–36)
MCV RBC AUTO: 88.2 FL — SIGNIFICANT CHANGE UP (ref 80–100)
NRBC # BLD: 0 /100 WBCS — SIGNIFICANT CHANGE UP (ref 0–0)
PLATELET # BLD AUTO: 239 K/UL — SIGNIFICANT CHANGE UP (ref 150–400)
RBC # BLD: 2.96 M/UL — LOW (ref 3.8–5.2)
RBC # FLD: 15.8 % — HIGH (ref 10.3–14.5)
WBC # BLD: 12.68 K/UL — HIGH (ref 3.8–10.5)
WBC # FLD AUTO: 12.68 K/UL — HIGH (ref 3.8–10.5)

## 2020-09-19 PROCEDURE — 86901 BLOOD TYPING SEROLOGIC RH(D): CPT

## 2020-09-19 PROCEDURE — 80048 BASIC METABOLIC PNL TOTAL CA: CPT

## 2020-09-19 PROCEDURE — 36415 COLL VENOUS BLD VENIPUNCTURE: CPT

## 2020-09-19 PROCEDURE — 86850 RBC ANTIBODY SCREEN: CPT

## 2020-09-19 PROCEDURE — C1765: CPT

## 2020-09-19 PROCEDURE — 82962 GLUCOSE BLOOD TEST: CPT

## 2020-09-19 PROCEDURE — 80053 COMPREHEN METABOLIC PANEL: CPT

## 2020-09-19 PROCEDURE — 85025 COMPLETE CBC W/AUTO DIFF WBC: CPT

## 2020-09-19 PROCEDURE — 88307 TISSUE EXAM BY PATHOLOGIST: CPT

## 2020-09-19 PROCEDURE — 81025 URINE PREGNANCY TEST: CPT

## 2020-09-19 PROCEDURE — 85027 COMPLETE CBC AUTOMATED: CPT

## 2020-09-19 PROCEDURE — 86900 BLOOD TYPING SEROLOGIC ABO: CPT

## 2020-09-19 RX ORDER — AMLODIPINE BESYLATE 2.5 MG/1
5 TABLET ORAL DAILY
Refills: 0 | Status: DISCONTINUED | OUTPATIENT
Start: 2020-09-19 | End: 2020-09-19

## 2020-09-19 RX ORDER — SIMETHICONE 80 MG/1
1 TABLET, CHEWABLE ORAL
Qty: 30 | Refills: 0
Start: 2020-09-19 | End: 2020-09-23

## 2020-09-19 RX ORDER — AMLODIPINE BESYLATE 2.5 MG/1
1 TABLET ORAL
Qty: 0 | Refills: 0 | DISCHARGE

## 2020-09-19 RX ORDER — IBUPROFEN 200 MG
1 TABLET ORAL
Qty: 20 | Refills: 0
Start: 2020-09-19 | End: 2020-09-23

## 2020-09-19 RX ORDER — ONDANSETRON 8 MG/1
1 TABLET, FILM COATED ORAL
Qty: 9 | Refills: 0
Start: 2020-09-19 | End: 2020-09-21

## 2020-09-19 RX ORDER — FERROUS SULFATE 325(65) MG
1 TABLET ORAL
Qty: 30 | Refills: 0
Start: 2020-09-19 | End: 2020-10-18

## 2020-09-19 RX ORDER — DESOXIMETASONE 0.05 %
1 CREAM (GRAM) TOPICAL
Qty: 0 | Refills: 0 | DISCHARGE

## 2020-09-19 RX ORDER — LORATADINE 10 MG/1
1 TABLET ORAL
Qty: 0 | Refills: 0 | DISCHARGE

## 2020-09-19 RX ORDER — ACETAMINOPHEN 500 MG
2 TABLET ORAL
Qty: 40 | Refills: 1
Start: 2020-09-19 | End: 2020-09-28

## 2020-09-19 RX ORDER — PANTOPRAZOLE SODIUM 20 MG/1
1 TABLET, DELAYED RELEASE ORAL
Qty: 30 | Refills: 0
Start: 2020-09-19 | End: 2020-10-18

## 2020-09-19 RX ORDER — CHLORPHEN/PSEUDOEPH/IBUPROFEN 2-30-200MG
1 TABLET ORAL
Qty: 0 | Refills: 0 | DISCHARGE

## 2020-09-19 RX ORDER — SENNOSIDES/DOCUSATE SODIUM 8.6MG-50MG
2 TABLET ORAL
Qty: 60 | Refills: 0
Start: 2020-09-19 | End: 2020-10-18

## 2020-09-19 RX ORDER — FERROUS SULFATE 325(65) MG
1 TABLET ORAL
Qty: 0 | Refills: 0 | DISCHARGE

## 2020-09-19 RX ORDER — AMLODIPINE BESYLATE 2.5 MG/1
1 TABLET ORAL
Qty: 0 | Refills: 0 | DISCHARGE
Start: 2020-09-19

## 2020-09-19 RX ADMIN — SIMETHICONE 80 MILLIGRAM(S): 80 TABLET, CHEWABLE ORAL at 17:09

## 2020-09-19 RX ADMIN — SIMETHICONE 80 MILLIGRAM(S): 80 TABLET, CHEWABLE ORAL at 11:20

## 2020-09-19 RX ADMIN — PANTOPRAZOLE SODIUM 40 MILLIGRAM(S): 20 TABLET, DELAYED RELEASE ORAL at 06:43

## 2020-09-19 RX ADMIN — SIMETHICONE 80 MILLIGRAM(S): 80 TABLET, CHEWABLE ORAL at 05:22

## 2020-09-19 RX ADMIN — Medication 325 MILLIGRAM(S): at 05:22

## 2020-09-19 RX ADMIN — HEPARIN SODIUM 5000 UNIT(S): 5000 INJECTION INTRAVENOUS; SUBCUTANEOUS at 11:20

## 2020-09-19 RX ADMIN — Medication 5 MILLIGRAM(S): at 14:45

## 2020-09-19 RX ADMIN — Medication 5 MILLIGRAM(S): at 17:09

## 2020-09-19 RX ADMIN — Medication 30 MILLIGRAM(S): at 05:22

## 2020-09-19 RX ADMIN — LORATADINE 10 MILLIGRAM(S): 10 TABLET ORAL at 11:20

## 2020-09-19 RX ADMIN — Medication 30 MILLIGRAM(S): at 05:50

## 2020-09-19 RX ADMIN — Medication 325 MILLIGRAM(S): at 13:01

## 2020-09-19 NOTE — DISCHARGE NOTE PROVIDER - NSDCFUADDAPPT_GEN_ALL_CORE_FT
no sex nothing in vagina no heavy lifting no pushing eat high fiber food ambulation daily as tolerated shower daily clean wound well and keep dry after; see your gynecologist in 1-2wks for follow up

## 2020-09-19 NOTE — PROGRESS NOTE ADULT - ASSESSMENT
89a  seen w dr guallpa    GYN PA NOTE  POD#3  -per dr radha rosario diet  -dc plan today per dr garcia and f/u in office in 2weeks  -dc instructions verbalized
A/P: 46y/o POD# 1 s/p JANET BS with acute on chronic anemia, stable  -cont pain management, postop care  -advance diet to regular after flatus  -oob, encourage ambulation  -iron PO for anemia  -heparin dvt  -f/u void  -d/w Dr. Peck

## 2020-09-19 NOTE — DISCHARGE NOTE PROVIDER - NSDCMRMEDTOKEN_GEN_ALL_CORE_FT
acetaminophen 500 mg oral capsule: 2 cap(s) orally every 6 hours, As Needed   ferrous sulfate (as elemental iron) 45 mg oral tablet, extended release: 1 tab(s) orally once a day   ibuprofen 600 mg oral tablet: 1 tab(s) orally every 6 hours, As needed, Mild pain or headache  Multiple Vitamins oral tablet: 1 tab(s) orally once a day  Rona-Colace 50 mg-8.6 mg oral tablet: 2 tab(s) orally once a day (at bedtime)   simethicone 80 mg oral tablet, chewable: 1 tab(s) orally every 4 hours, As needed, Gas   acetaminophen 500 mg oral capsule: 2 cap(s) orally every 6 hours, As Needed   amLODIPine 5 mg oral tablet: 1 tab(s) orally once a day  ferrous sulfate (as elemental iron) 45 mg oral tablet, extended release: 1 tab(s) orally once a day   ibuprofen 600 mg oral tablet: 1 tab(s) orally every 6 hours, As needed, Mild pain or headache  pantoprazole 40 mg oral delayed release tablet: 1 tab(s) orally once a day (before a meal)  Rona-Colace 50 mg-8.6 mg oral tablet: 2 tab(s) orally once a day (at bedtime)   simethicone 80 mg oral tablet, chewable: 1 tab(s) orally every 4 hours, As needed, Gas   acetaminophen 500 mg oral capsule: 2 cap(s) orally every 6 hours, As Needed   amLODIPine 5 mg oral tablet: 1 tab(s) orally once a day  bisacodyl 5 mg oral delayed release tablet: 1 tab(s) orally every 12 hours  ferrous sulfate (as elemental iron) 45 mg oral tablet, extended release: 1 tab(s) orally once a day   ibuprofen 600 mg oral tablet: 1 tab(s) orally every 6 hours, As needed, Mild pain or headache  pantoprazole 40 mg oral delayed release tablet: 1 tab(s) orally once a day (before a meal)  Rona-Colace 50 mg-8.6 mg oral tablet: 2 tab(s) orally once a day (at bedtime)   simethicone 80 mg oral tablet, chewable: 1 tab(s) orally every 4 hours, As needed, Gas  Zofran 4 mg oral tablet: 1 tab(s) orally every 8 hours, As Needed -for rash - for nausea

## 2020-09-19 NOTE — DISCHARGE NOTE PROVIDER - NSDCCPCAREPLAN_GEN_ALL_CORE_FT
PRINCIPAL DISCHARGE DIAGNOSIS  Diagnosis: S/P hysterectomy  Assessment and Plan of Treatment: S/P hysterectomy

## 2020-09-19 NOTE — DISCHARGE NOTE PROVIDER - NSDCCPTREATMENT_GEN_ALL_CORE_FT
PRINCIPAL PROCEDURE  Procedure: Total abdominal hysterectomy  Findings and Treatment: wound check with dr garcia in 2weeks      SECONDARY PROCEDURE  Procedure: Bilateral salpingectomy  Findings and Treatment: wound check with dr garcia in 2weeks

## 2020-09-19 NOTE — PROGRESS NOTE ADULT - SUBJECTIVE AND OBJECTIVE BOX
920a  seen w dr guallpa    GYN PA NOTE  POD#3  Patient seen resting comfortably.  No current complaints.  Denies HA, CP, SOB, dizziness, palpitations, worsening abdominal pain, worsening vaginal bleeding or any other concerns.  + Ambulation, + void without difficulty, + flatus this am; doing well    Vital Signs Last 24 Hrs  T(C): 37 (19 Sep 2020 05:31), Max: 37 (19 Sep 2020 05:31)  T(F): 98.6 (19 Sep 2020 05:31), Max: 98.6 (19 Sep 2020 05:31)  HR: 80 (19 Sep 2020 05:31) (80 - 89)  BP: 125/78 (19 Sep 2020 05:31) (125/78 - 145/77)  BP(mean): --  RR: 16 (19 Sep 2020 05:31) (16 - 18)  SpO2: 96% (19 Sep 2020 05:31) (96% - 100%)  CAPILLARY BLOOD GLUCOSE          Gen: A&O x3, NAD  Chest: CTABL  Cardiac: S1+S2+ RRR  Abdomen: Soft, nontender, +BS, nondistended, incisions clean/dry/intact  Gyn: No active bleeding  Extremities: Nontender, no worsening edema, DTRS 2+, venodynes intact bilaterally                          8.1    12.68 )-----------( 239      ( 19 Sep 2020 06:52 )             26.1         09-18    139  |  107  |  7   ----------------------------<  84  3.6   |  29  |  0.76    Ca    8.4      18 Sep 2020 17:36    TPro  6.8  /  Alb  3.0<L>  /  TBili  0.5  /  DBili  x   /  AST  16  /  ALT  17  /  AlkPhos  55  09-18    09-18    139  |  107  |  7   ----------------------------<  84  3.6   |  29  |  0.76    Ca    8.4      18 Sep 2020 17:36    TPro  6.8  /  Alb  3.0<L>  /  TBili  0.5  /  DBili  x   /  AST  16  /  ALT  17  /  AlkPhos  55  09-18    CAPILLARY BLOOD GLUCOSE        LIVER FUNCTIONS - ( 18 Sep 2020 17:36 )  Alb: 3.0 g/dL / Pro: 6.8 g/dL / ALK PHOS: 55 U/L / ALT: 17 U/L DA / AST: 16 U/L / GGT: x           
GYN PA Progress Note POD#1    Patient seen at bedside resting comfortably offers no new complaints. + Ambulation, olsen recently removed, due to void, - flatus; tolerating clear diet. Denies HA, CP, SOB, N/V/D,  no bm; dizziness, palpitations, worsening abdominal pain, worsening vaginal bleeding, or any other concerns.     Vital Signs Last 24 Hrs  T(C): 37.1 (17 Sep 2020 05:05), Max: 37.1 (16 Sep 2020 16:05)  T(F): 98.8 (17 Sep 2020 05:05), Max: 98.8 (16 Sep 2020 16:05)  HR: 89 (17 Sep 2020 05:05) (74 - 98)  BP: 119/61 (17 Sep 2020 05:05) (118/71 - 144/85)  BP(mean): 86 (16 Sep 2020 14:45) (75 - 95)  RR: 18 (17 Sep 2020 05:05) (14 - 20)  SpO2: 100% (17 Sep 2020 05:05) (96% - 100%)    Gen: A&O x 3, NAD  Chest: CTA B/L  Cardiac: S1,S2  RRR  Abdomen: +BS, soft, Nontender, nondistended, dressing removed, steri strips in place, wound is clean and dry  Gyn: no vaginal bleeding   Extremities: Nontender, no worsening edema                          9.0    14.01 )-----------( 276      ( 17 Sep 2020 06:54 )             29.3     09-17    140  |  109<H>  |  6<L>  ----------------------------<  94  3.8   |  25  |  0.78    Ca    8.5      17 Sep 2020 06:54        
POD 1  Doing well  No SOB  No n/v  No flatus yet  Tolerated reg diet    vss afeb  chest:CTA  abd:c/d/i, +BS, ND  ext:NT    hg 9 this AM    a/p:stable. Ambulate. inc spirometer
Patient seen resting comfortably.  No current complaints.  Denies HA, CP, SOB, dizziness, palpitations, worsening abdominal pain, worsening vaginal bleeding or any other concerns.  + Ambulation, + void without difficulty, No flatus, but +belching.  States that she ate solid food last night, but had 2 episodes of vomiting bile fluids last night.  Was given reglan, which improved.      Vital Signs Last 24 Hrs  T(C): 36.9 (18 Sep 2020 05:34), Max: 36.9 (17 Sep 2020 20:39)  T(F): 98.5 (18 Sep 2020 05:34), Max: 98.5 (18 Sep 2020 05:34)  HR: 80 (18 Sep 2020 05:34) (77 - 95)  BP: 147/81 (18 Sep 2020 05:34) (120/66 - 147/81)  RR: 16 (18 Sep 2020 05:34) (16 - 18)  SpO2: 96% (18 Sep 2020 05:34) (96% - 99%)    Gen: A&O x3, NAD  Chest: CTABL  Cardiac: S1+S2+ RRR  Abdomen: Soft, nontender, +BS, nondistended, incisions clean/dry/intact with steris   Gyn: No active bleeding  Extremities: Nontender, no worsening edema, DTRS 2+, venodynes intact bilaterally                          9.0    14.69 )-----------( 248      ( 18 Sep 2020 07:27 )             28.2      09-17    140  |  109<H>  |  6<L>  ----------------------------<  94  3.8   |  25  |  0.78    Ca    8.5      17 Sep 2020 06:54        A/P:  45 year old female s/p JANET/BS post op day 2.  S/p 2 episodes of vomiting last night, able to tolerate clears currently    - Pain management  - Continue clear diet currently until passes flatus  - cbc in am  - Advance as per protocol  -OOB and ambulation encouraged    Discussed with Dr Peck

## 2020-09-19 NOTE — DISCHARGE NOTE NURSING/CASE MANAGEMENT/SOCIAL WORK - PATIENT PORTAL LINK FT
You can access the FollowMyHealth Patient Portal offered by [facility] by registering at the following website: [email or web address]. By joining [employer]’s FollowMyHealth portal, you will also be able to view your health information using other applications (apps) compatible with our system.

## 2020-09-19 NOTE — DISCHARGE NOTE PROVIDER - CARE PROVIDER_API CALL
Keaton Peck  OBSTETRICS AND GYNECOLOGY  [id number] [place] [id number]  Phone: [phone]  Fax: [phone]  Follow Up Time: 2 weeks

## 2020-09-21 LAB — SURGICAL PATHOLOGY STUDY: SIGNIFICANT CHANGE UP

## 2021-06-14 NOTE — DISCHARGE NOTE PROVIDER - NSDCADMDATE_GEN_ALL_CORE_FT
16-Sep-2020 00:00 Hypertension Hypertension Hypertension Hypertension Hypertension Hypertension Hypertension Hypertension Hypertension

## 2021-08-16 NOTE — H&P PST ADULT - PRO TOBACCO TYPE

## 2022-10-26 NOTE — PATIENT PROFILE ADULT - NSPROPATIENTLACTATING_GEN_A_NUR
Chief Complaint(s) and History of Present Illness(es)     Aniridia Follow Up            Laterality: both eyes    Course: stable    Associated symptoms: Negative for double vision, a need for brighter lights and eye pain    Comments: Stable vision, wearing gls full time. Some photophobia outside. No AHP.           Droopy Both Upper Lids            Laterality: right upper lid and left upper lid    Severity: moderate    Onset: present since childhood    Course: gradually worsening    Associated signs and symptoms: Negative for blurred vision, double vision, lid swelling, eye pain and neck pain    Comments: Referred by Dr. Holland for ptosis repair, thought this would help so he can get an unrestricted 's license. Mom had ptosis repair ~ age 22.           Comments    Ifn: mom/gf     Mom now has glaucoma, on drops, may have surgery soon.            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   Pannus (corneal), bilateral    Mom treated for limbal stem cell deficiency by Dr. Marrero s/p [facility].    Perhaps slightly worse compared to slit lamp photos 9/23/2016 to follow pannus baseline.     slit lamp photos stable 10/2/2019. Monitor.     - encouraged PFATs QID - 6 times per day again with rationale explained at length.     Exotropia, V-Pattern with BIOOA   s/p BIOMx + BLR 8  5/10/16    Alignment stable, excellent.     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    Low vision, both eyes  Improved with glasses. Continue. Had referral to Milena Lynch.   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 DMV paperwork completed. Explained borderline for driving and may need low vision training / telescopes in the future.     Ptosis surgery is not necessary to allow for driving and risks exposure keratopathy and worsening LSCD. Richar is not bothered by the ptosis and will let it be.        Return in about 1 year (around 10/26/2023) for DFE & CRx careful to push BCVA with both eyes open for license. No tetracaine/proparacaine to protect cornea: use [employer].    Patient Instructions   Tips for reducing fogging of glasses while wearing a mask  Choose a well-fitting mask. It should fit snuggly across the bridge of your nose with few to no gaps. Masks with a wire that goes across the entire top work best. These allow you to shape the top of the mask to fit your nose exactly. Cloth masks generally do not provide a great top edge fit.  - https://www.[employer]/FLUIDSHIELD-Fog-Free-Procedure-Protection--83/dp/L20ZS03RI6/ref=sr_1_1?dchild=1&tbr=6213106179&refinements=p_89%3AHALYARD&s=industrial&sr=1-1  - https://www.amazon.com/Disposable-Protection-Children-Individually-Wrapped/dp/A60CUD3II2/ref=sr_1_9?dchild=1&keywords=kids%2Bsurgical%2Bmask&xyb=9812490985&sr=8-9&th=1     Use an adhesive to secure the mask to your nose. Paper or silicone tape across the top of your mask can help keep air from escaping. You can also opt for a double-sided tape placed between your nose and mask to keep the mask flush across your face. Silicone tape is very gentle on skin and acts as a humidity barrier.   - https://www.[employer]/shop/[facility]-soft-silicone-tape-prodid-3347522    There are several products sold online that help  reduce fogging. They come in both disposable and reusable wipes.  - Disposable anti fog wipes: https://www.[employer]/OptiPlus-Anti-Fog-Lens-Wipes/dp/P836LEOJO7/ref=sr_1_6?crid=0QQGPAK0T2XYU&dchild=1&keywords=anti+fog+for+glasses+wipes&dfd=7418315343&sprefix=anti+fog+for+glasses%2Caps%2C369&sr=8-6  - Reusable anti fog wipes: https://www.[employer]/JYS-TECH-Easy-Anti-Fog-Cloth/dp/D021LIE65R/ref=sr_1_7?crid=4HSBQZJ8K8LOB&dchild=1&keywords=anti+fog+for+glasses+wipes&oat=7520171588&sprefix=anti+fog+for+glasses%2Caps%2C369&sr=8-7    Some patients have reported success with cleaning the glasses each morning with mild dish soap and water. Keep in mind that this can cause lens cracking issues, depending on the lens material and the soap.        Visit Diagnoses & Orders    ICD-10-CM    1. Congenital hypoplasia of fovea centralis  Q14.1 [employer]         Attending Physician Attestation:  Complete documentation of historical and exam elements from today's encounter can be found in the full encounter summary report (not reduplicated in this progress note).  I personally obtained the chief complaint(s) and history of present illness.  I confirmed and edited as necessary the review of systems, past medical/surgical history, family history, social history, and examination findings as documented by others; and I examined the patient myself.  I personally reviewed the relevant tests, images, and reports as documented above.  I formulated and edited as necessary the assessment and plan and discussed the findings and management plan with the patient and family. - Hugh White Jr., MD    no